# Patient Record
Sex: MALE | Race: WHITE | NOT HISPANIC OR LATINO | Employment: FULL TIME | URBAN - METROPOLITAN AREA
[De-identification: names, ages, dates, MRNs, and addresses within clinical notes are randomized per-mention and may not be internally consistent; named-entity substitution may affect disease eponyms.]

---

## 2024-09-20 ENCOUNTER — HOSPITAL ENCOUNTER (EMERGENCY)
Facility: HOSPITAL | Age: 34
Discharge: HOME | End: 2024-09-21

## 2024-09-20 ENCOUNTER — APPOINTMENT (EMERGENCY)
Dept: CT IMAGING | Facility: HOSPITAL | Age: 34
End: 2024-09-20

## 2024-09-20 DIAGNOSIS — F12.90 MARIJUANA USE: Primary | ICD-10-CM

## 2024-09-20 LAB
BASOPHILS # BLD AUTO: 0.03 THOUSANDS/ΜL (ref 0–0.1)
BASOPHILS NFR BLD AUTO: 0 % (ref 0–1)
EOSINOPHIL # BLD AUTO: 0.01 THOUSAND/ΜL (ref 0–0.61)
EOSINOPHIL NFR BLD AUTO: 0 % (ref 0–6)
ERYTHROCYTE [DISTWIDTH] IN BLOOD BY AUTOMATED COUNT: 12.5 % (ref 11.6–15.1)
HCT VFR BLD AUTO: 41.4 % (ref 36.5–49.3)
HGB BLD-MCNC: 13.7 G/DL (ref 12–17)
IMM GRANULOCYTES # BLD AUTO: 0.08 THOUSAND/UL (ref 0–0.2)
IMM GRANULOCYTES NFR BLD AUTO: 1 % (ref 0–2)
LYMPHOCYTES # BLD AUTO: 1.19 THOUSANDS/ΜL (ref 0.6–4.47)
LYMPHOCYTES NFR BLD AUTO: 10 % (ref 14–44)
MCH RBC QN AUTO: 30.3 PG (ref 26.8–34.3)
MCHC RBC AUTO-ENTMCNC: 33.1 G/DL (ref 31.4–37.4)
MCV RBC AUTO: 92 FL (ref 82–98)
MONOCYTES # BLD AUTO: 0.78 THOUSAND/ΜL (ref 0.17–1.22)
MONOCYTES NFR BLD AUTO: 6 % (ref 4–12)
NEUTROPHILS # BLD AUTO: 10.01 THOUSANDS/ΜL (ref 1.85–7.62)
NEUTS SEG NFR BLD AUTO: 83 % (ref 43–75)
NRBC BLD AUTO-RTO: 0 /100 WBCS
PLATELET # BLD AUTO: 258 THOUSANDS/UL (ref 149–390)
PMV BLD AUTO: 9.5 FL (ref 8.9–12.7)
RBC # BLD AUTO: 4.52 MILLION/UL (ref 3.88–5.62)
WBC # BLD AUTO: 12.1 THOUSAND/UL (ref 4.31–10.16)

## 2024-09-20 PROCEDURE — 99283 EMERGENCY DEPT VISIT LOW MDM: CPT

## 2024-09-20 PROCEDURE — 36415 COLL VENOUS BLD VENIPUNCTURE: CPT

## 2024-09-20 PROCEDURE — 85025 COMPLETE CBC W/AUTO DIFF WBC: CPT

## 2024-09-20 PROCEDURE — 99285 EMERGENCY DEPT VISIT HI MDM: CPT

## 2024-09-20 PROCEDURE — 80048 BASIC METABOLIC PNL TOTAL CA: CPT

## 2024-09-20 PROCEDURE — 93005 ELECTROCARDIOGRAM TRACING: CPT

## 2024-09-20 PROCEDURE — 96360 HYDRATION IV INFUSION INIT: CPT

## 2024-09-20 PROCEDURE — 70450 CT HEAD/BRAIN W/O DYE: CPT

## 2024-09-20 PROCEDURE — 96361 HYDRATE IV INFUSION ADD-ON: CPT

## 2024-09-20 RX ADMIN — SODIUM CHLORIDE 1000 ML: 0.9 INJECTION, SOLUTION INTRAVENOUS at 22:45

## 2024-09-21 VITALS
HEIGHT: 67 IN | SYSTOLIC BLOOD PRESSURE: 138 MMHG | HEART RATE: 95 BPM | BODY MASS INDEX: 42.21 KG/M2 | OXYGEN SATURATION: 97 % | DIASTOLIC BLOOD PRESSURE: 67 MMHG | RESPIRATION RATE: 20 BRPM | WEIGHT: 268.96 LBS | TEMPERATURE: 98.5 F

## 2024-09-21 LAB
ANION GAP SERPL CALCULATED.3IONS-SCNC: 12 MMOL/L (ref 4–13)
ATRIAL RATE: 132 BPM
ATRIAL RATE: 132 BPM
BUN SERPL-MCNC: 17 MG/DL (ref 5–25)
CALCIUM SERPL-MCNC: 9.4 MG/DL (ref 8.4–10.2)
CHLORIDE SERPL-SCNC: 98 MMOL/L (ref 96–108)
CO2 SERPL-SCNC: 26 MMOL/L (ref 21–32)
CREAT SERPL-MCNC: 1.25 MG/DL (ref 0.6–1.3)
GFR SERPL CREATININE-BSD FRML MDRD: 73 ML/MIN/1.73SQ M
GLUCOSE SERPL-MCNC: 92 MG/DL (ref 65–140)
P AXIS: 53 DEGREES
P AXIS: 53 DEGREES
POTASSIUM SERPL-SCNC: 3.8 MMOL/L (ref 3.5–5.3)
PR INTERVAL: 146 MS
PR INTERVAL: 146 MS
QRS AXIS: 49 DEGREES
QRS AXIS: 49 DEGREES
QRSD INTERVAL: 74 MS
QRSD INTERVAL: 76 MS
QT INTERVAL: 284 MS
QT INTERVAL: 298 MS
QTC INTERVAL: 420 MS
QTC INTERVAL: 441 MS
SODIUM SERPL-SCNC: 136 MMOL/L (ref 135–147)
T WAVE AXIS: 10 DEGREES
T WAVE AXIS: 11 DEGREES
VENTRICULAR RATE: 132 BPM
VENTRICULAR RATE: 132 BPM

## 2024-09-21 PROCEDURE — 93010 ELECTROCARDIOGRAM REPORT: CPT | Performed by: STUDENT IN AN ORGANIZED HEALTH CARE EDUCATION/TRAINING PROGRAM

## 2024-09-21 NOTE — ED NOTES
Transportation back home arranged via [employer] [facility]. Per [employer] [facility], ETA P/U 0130. Pt made updated for same.     Wu Houser RN  09/21/24 [id number]

## 2024-09-21 NOTE — DISCHARGE INSTRUCTIONS
Please do not use THC gummies if you experienced reaction from it.    Return to the ED with any new/concerning issues.

## 2024-09-21 NOTE — ED PROVIDER NOTES
1. Marijuana use      ED Disposition       ED Disposition   Discharge    Condition   Stable    Date/Time   Sat Sep 21, 2024 12:26 AM    Comment   Nicholas Benedict discharge to home/self care.                   Assessment & Plan       Medical Decision Making  Amount and/or Complexity of Data Reviewed  Labs: ordered. Decision-making details documented in ED Course.  Radiology: ordered.      Patient is a 34-year-old male presenting to the ED via EMS for evaluation after being found naked at his neighbor's house, in the context of using THC and lorazepam for a anxiety attack.  History and clinical exam documented below.  On evaluation, patient is tachycardic, likely from the effects of marijuana.  EKG is without evidence of STEMI.  Patient was given IV fluids.  Given that the fact that patient has no recollection of either losing consciousness, or sustaining trauma, a CT head was ordered to rule intracranial abnormality, though there were no signs of trauma on patient's exam otherwise.  Labs ordered to rule out any evidence of electrolyte disturbance.    Diagnostic testing reviewed with the patient.  CT head is negative.  Labs are unremarkable.  Patient's tachycardia has improved with IV fluids and there have been no adverse events on cardiac monitoring.  Patient is alert, awake, able to ambulate in the ED.  States he feels less anxious.    I reviewed all testing with the patient:  I gave oral return precautions for what to return for in addition to the written return precautions.   The patient verbalized understanding of the discharge instructions and warnings that would necessitate return to the Emergency Department.  I specifically highlighted areas of special concern regarding the written and verbal discharge instructions and return precautions.    All questions were answered prior to discharge.             ED Course as of 09/21/24 0205   Fri Sep 20, 2024   2136 EKG: ST at 132 bpm, normal axis, normal intervals, no acute  ischemic changes as interpreted by me   2253 IMPRESSION:     No acute intracranial abnormality.        Sat Sep 21, 2024   0025 Pulse: 97  Tachycardia improved     0025 Creatinine: 1.25   0025 WBC(!): 12.10  Likely reactive from tachycardia       Medications   sodium chloride 0.9 % bolus 1,000 mL (0 mL Intravenous Stopped 9/21/24 0033)       History of Present Illness       HPI    Patient is a 34-year-old male with no significant past medical history, presenting to the ED via EMS for evaluation.  Patient states that he took 80 mg of THC Gummies this evening, and afterward began to feel a panic sensation.  He took a tab of lorazepam, which he has had in the past.  Per EMS report, patient was then found naked at his neighbor's house.  EMS was called and patient was brought to the ED for evaluation.  Patient does not recall any direct trauma.  States that he has no somatic complaints at this time.  States that THC in the past has made him feel very anxious.  Patient denies any recent fevers, chills, cough or congestion, complaints of chest pain, shortness of breath, abdominal pain, vomiting.  He denies any headache, visual disturbance, dizziness, neck pain.  States he does not take any anticoagulation.  Patient is able to ambulate and has no complaints of extremity pain.    Review of Systems   All other systems reviewed and are negative.          Objective     ED Triage Vitals   Temperature Pulse Blood Pressure Respirations SpO2 Patient Position - Orthostatic VS   09/20/24 2044 09/20/24 2044 09/20/24 2046 09/20/24 2044 09/20/24 2044 09/20/24 2044   98.5 °F (36.9 °C) (!) 120 140/73 20 95 % Lying      Temp Source Heart Rate Source BP Location FiO2 (%) Pain Score    09/20/24 2044 09/20/24 2044 09/20/24 2046 -- 09/20/24 2044    Oral Monitor Right arm  No Pain        Physical Exam  Vitals and nursing note reviewed.   Constitutional:       General: He is not in acute distress.     Appearance: Normal appearance. He is  well-developed. He is obese. He is not ill-appearing, toxic-appearing or diaphoretic.   HENT:      Head: Normocephalic and atraumatic.      Comments: No signs of basilar skull fracture       Right Ear: External ear normal.      Left Ear: External ear normal.      Nose: Nose normal. No congestion.      Mouth/Throat:      Mouth: Mucous membranes are moist.      Pharynx: Oropharynx is clear.   Eyes:      General: No scleral icterus.     Extraocular Movements: Extraocular movements intact.      Conjunctiva/sclera: Conjunctivae normal.      Pupils: Pupils are equal, round, and reactive to light.   Cardiovascular:      Rate and Rhythm: Regular rhythm. Tachycardia present.      Pulses: Normal pulses.      Heart sounds: Normal heart sounds. No murmur heard.  Pulmonary:      Effort: Pulmonary effort is normal. No respiratory distress.      Breath sounds: Normal breath sounds. No wheezing, rhonchi or rales.      Comments: No chest wall bruising  Chest:      Chest wall: No tenderness.   Abdominal:      General: Abdomen is flat.      Palpations: Abdomen is soft.      Tenderness: There is no abdominal tenderness. There is no guarding or rebound.      Comments: No abdominal bruising.   Musculoskeletal:         General: No swelling. Normal range of motion.      Cervical back: Normal range of motion and neck supple. No tenderness.      Right lower leg: No edema.      Left lower leg: No edema.   Skin:     General: Skin is warm and dry.      Capillary Refill: Capillary refill takes less than 2 seconds.      Findings: No erythema.   Neurological:      General: No focal deficit present.      Mental Status: He is alert and oriented to person, place, and time.      Cranial Nerves: No cranial nerve deficit.      Sensory: No sensory deficit.      Motor: No weakness.   Psychiatric:         Mood and Affect: Mood normal.         Labs Reviewed   CBC AND DIFFERENTIAL - Abnormal       Result Value    WBC 12.10 (*)     RBC 4.52      Hemoglobin  13.7      Hematocrit 41.4      MCV 92      MCH 30.3      MCHC 33.1      RDW 12.5      MPV 9.5      Platelets 258      nRBC 0      Segmented % 83 (*)     Immature Grans % 1      Lymphocytes % 10 (*)     Monocytes % 6      Eosinophils Relative 0      Basophils Relative 0      Absolute Neutrophils 10.01 (*)     Absolute Immature Grans 0.08      Absolute Lymphocytes 1.19      Absolute Monocytes 0.78      Eosinophils Absolute 0.01      Basophils Absolute 0.03     BASIC METABOLIC PANEL    Sodium 136      Potassium 3.8      Chloride 98      CO2 26      ANION GAP 12      BUN 17      Creatinine 1.25      Glucose 92      Calcium 9.4      eGFR 73      Narrative:     [employer] guidelines for Chronic Kidney Disease (CKD):     Stage 1 with normal or high GFR (GFR > 90 mL/min/1.73 square meters)    Stage 2 Mild CKD (GFR = 60-89 mL/min/1.73 square meters)    Stage 3A Moderate CKD (GFR = 45-59 mL/min/1.73 square meters)    Stage 3B Moderate CKD (GFR = 30-44 mL/min/1.73 square meters)    Stage 4 Severe CKD (GFR = 15-29 mL/min/1.73 square meters)    Stage 5 End Stage CKD (GFR <15 mL/min/1.73 square meters)  Note: GFR calculation is accurate only with a steady state creatinine     CT head without contrast   Final Interpretation by Pal Kelley MD (09/20 2251)      No acute intracranial abnormality.                  Workstation performed: [id number]             Procedures    ED Medication and Procedure Management   None     There are no discharge medications for this patient.    No discharge procedures on file.     Alcides Vogel, DO  09/21/24 0205